# Patient Record
(demographics unavailable — no encounter records)

---

## 2024-11-25 NOTE — REASON FOR VISIT
[Home] : at home, [unfilled] , at the time of the visit. [Other Location: e.g. Home (Enter Location, City,State)___] : at [unfilled] [Patient] : the patient [Self] : self [Follow - Up] : a follow-up visit [DM Type 1] : DM Type 1

## 2024-11-25 NOTE — HISTORY OF PRESENT ILLNESS
[FreeTextEntry1] : Patient doing better on Tslim and Dexcom  DM type:1 Severity: moderate Duration: 20 years   Current regimen: On omnipod in the past. Now on Tslim hospitalized with DKA occasional hypoglycemia with good awareness well controlled during pregnancy and no complications. Had COVID during pregnancy, hospitalized for one day.  Stressed and overwhelmed at times. 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  Lows occurring at night, around 10:30, not happen too often.  Yesterday, BG 65 due to running.   Dexcom showing high blood sugars overnight due to patient fearful of lows and will overcorrect if having a low blood sugar.  seen eye doctor this year, cataract in eye - has not changed in 5 years [Continuous Glucose Monitoring] : Continuous Glucose Monitoring: Yes [Dexcom] : Dexcom [FreeTextEntry2] : 45 [FreeTextEntry3] : 54 [FreeTextEntry4] : 2 [de-identified] : 8.2 [FreeTextEntry5] : 205 [FreeTextEntry7] : 85

## 2024-11-25 NOTE — PHYSICAL EXAM
[Alert] : alert [No Acute Distress] : no acute distress [de-identified] : Physical exam deferred due to Telehealth visit.

## 2024-11-25 NOTE — REVIEW OF SYSTEMS
[Fatigue] : no fatigue [Decreased Appetite] : appetite not decreased [Recent Weight Gain (___ Lbs)] : no recent weight gain [Recent Weight Loss (___ Lbs)] : no recent weight loss [Visual Field Defect] : no visual field defect [Blurred Vision] : no blurred vision [Dysphagia] : no dysphagia [Neck Pain] : no neck pain [Dysphonia] : no dysphonia [Chest Pain] : no chest pain [Palpitations] : no palpitations [Constipation] : no constipation [Diarrhea] : no diarrhea [Polyuria] : no polyuria [Dysuria] : no dysuria [Headaches] : no headaches [Tremors] : no tremors [Depression] : no depression [Anxiety] : no anxiety [Polydipsia] : no polydipsia [FreeTextEntry2] : weight stable

## 2024-11-25 NOTE — ASSESSMENT
[FreeTextEntry1] : DM type 1, on closed loop system, start having carb before exercise due to lows after exercise, patient already holds insulin while exercising. Advised patient to avoid over correcting. Needs updated labs. Call office office with any lows/high blood sugars.  Patient interested in GLP-1 however she does not meet BMI criteria.   Seen by gi for liver disease and has elevated liver enzymes. She reports having a normal ultrasound, and no specific diagnosis made. Will check LFTs now.    This patient with diabetes - Injects insulin 1+ times daily - Is currently on CGM, testing glucose continuously - Has been seen in the office by a provider within the last six months to review CGM data with their provider CGM is medically necessary for this pt. - CGM will improve/maintain A1c - CGM will reduce hypoglycemic events Dexcom requires one test strip daily to use in case of sensor failure or for blood glucose verification or during sensor warmup.  RTO in 2 months with Dr. Martinez.  Overall spent 30 minutes with patient and documenting.

## 2025-02-26 NOTE — ASSESSMENT
[FreeTextEntry1] : DM type 1, on pump therapy, suboptimal control due to insufficient meal bolus. Discussed and recommended more aggressive pre-meal bolus. no h/o microvascular complications  This patient with diabetes - Injects insulin 1+ times daily - Is currently on CGM, testing glucose continuously - Has been seen in the office by a provider within the last six months to review CGM data with their provider CGM is medically necessary for this pt. - CGM will improve/maintain A1c - CGM will reduce hypoglycemic events Dexcom requires one test strip daily to use in case of sensor failure or for blood glucose verification or during sensor warmup.  RTO in 2 months with Dr. Martinez.  Overall spent 30 minutes with patient and documenting.

## 2025-02-26 NOTE — HISTORY OF PRESENT ILLNESS
[Continuous Glucose Monitoring] : Continuous Glucose Monitoring: Yes [Dexcom] : Dexcom [FreeTextEntry1] : Patient doing better on Tslim and Dexcom  DM type:1 Severity: moderate Duration: 21 years   Current regimen: On omnipod in the past. Now on Tslim hospitalized with DKA occasional hypoglycemia with good awareness well controlled during pregnancy and no complications. Had COVID during pregnancy, hospitalized for one day.  Stressed and overwhelmed at times. 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  Lows occurring at night, around 10:30, not happen too often.   Dexcom showing high blood sugars overnight due to patient fearful of lows and will overcorrect if having a low blood sugar.  seen eye doctor this year, cataract in eye - has not changed in 5 years [FreeTextEntry2] : 28 [FreeTextEntry3] : 72 [FreeTextEntry4] : 0 [de-identified] : 9.2 [FreeTextEntry5] : 246 [FreeTextEntry6] : 38